# Patient Record
Sex: FEMALE | Race: WHITE
[De-identification: names, ages, dates, MRNs, and addresses within clinical notes are randomized per-mention and may not be internally consistent; named-entity substitution may affect disease eponyms.]

---

## 2018-10-02 NOTE — PHYS DOC
Past Medical History


Past Medical History:  Diabetes-Type I, GERD, High Cholesterol, Hypertension


Past Surgical History:  Hip Replacement


Alcohol Use:  None


Drug Use:  None





Adult General


Chief Complaint


Chief Complaint:  BLOOD SUGAR PROBLEM





HPI


HPI





Patient is a 63  year old F who presents with elevated blood sugars.  Patient 

reports she was treated for uti a week ago.  She felt like her symptoms have 

resolved.  Earlier today, her blood sugar was in the high 400s.  When she 

rechecked it, it was over 500.  She denies pain, n/v/d, fever or chills.





Review of Systems


Review of Systems





Constitutional: Denies fever or chills []


Respiratory: Denies cough or shortness of breath []


Cardiovascular: Denies chest pain or sob


GI: Denies abdominal pain, nausea, vomiting


: Denies dysuria or hematuria []


Musculoskeletal: Denies back pain or joint pain []


Integument: Denies rash or skin lesions []


Neurologic: Denies headache, focal weakness or sensory changes []








All other systems were reviewed and found to be within normal limits, except as 

documented in this note.





Current Medications


Current Medications





Current Medications








 Medications


  (Trade)  Dose


 Ordered  Sig/Bud  Start Time


 Stop Time Status Last Admin


Dose Admin


 


 Ondansetron HCl


  (Zofran)  4 mg  1X  ONCE  10/2/18 13:45


 10/2/18 13:46 DC  





 


 Sodium Chloride  1,000 ml @ 


 1,000 mls/hr  1X  ONCE  10/2/18 14:45


 10/2/18 15:44 DC 10/2/18 14:51


1,000 MLS/HR











Allergies


Allergies





Allergies








Coded Allergies Type Severity Reaction Last Updated Verified


 


  No Known Drug Allergies    9/24/17 No











Physical Exam


Physical Exam





Constitutional: Well developed, well nourished, no acute distress, non-toxic 

appearance. []


HENT: Normocephalic, atraumatic


Eyes: PERRLA, EOMI, conjunctiva normal, no discharge. [] 


Neck: Normal range of motion, no tenderness, supple, no stridor. [] 


Cardiovascular:Heart rate regular rhythm, no murmur []


Lungs & Thorax:  Bilateral breath sounds clear to auscultation []


Abdomen: Bowel sounds normal, soft, no tenderness, no masses, no pulsatile 

masses. [] 


Skin: Warm, dry, no erythema, no rash. [] 


Neurologic: Alert and oriented X 3, normal motor function, normal sensory 

function, no focal deficits noted. []


Psychologic: Affect normal, judgement normal, mood normal. []





Current Patient Data


Vital Signs





 Vital Signs








  Date Time  Temp Pulse Resp B/P (MAP) Pulse Ox O2 Delivery O2 Flow Rate FiO2


 


10/2/18 16:25  76 20 134/67 (89) 97   


 


10/2/18 13:24 98.6     Room Air  





 98.6       








Lab Values





 Laboratory Tests








Test


 10/2/18


13:30 10/2/18


13:31 10/2/18


13:40 10/2/18


14:39


 


Urine Collection Type Unknown     


 


Urine Color Yellow     


 


Urine Clarity Clear     


 


Urine pH 6.5     


 


Urine Specific Gravity 1.020     


 


Urine Protein


 Negative mg/dL


(NEG-TRACE) 


 


 





 


Urine Glucose (UA)


 >=1000 mg/dL


(NEG) 


 


 





 


Urine Ketones (Stick)


 Negative mg/dL


(NEG) 


 


 





 


Urine Blood


 Negative (NEG)


 


 


 





 


Urine Nitrite


 Negative (NEG)


 


 


 





 


Urine Bilirubin


 Negative (NEG)


 


 


 





 


Urine Urobilinogen Dipstick


 0.2 mg/dL (0.2


mg/dL) 


 


 





 


Urine Leukocyte Esterase


 Negative (NEG)


 


 


 





 


Urine RBC


 Occ /HPF (0-2)


 


 


 





 


Urine WBC


 Occ /HPF (0-4)


 


 


 





 


Urine Squamous Epithelial


Cells Mod /LPF  


 


 


 





 


Urine Bacteria


 Moderate /HPF


(0-FEW) 


 


 





 


Glucose (Fingerstick)


 


 436 mg/dL


(70-99)  H 


 348 mg/dL


(70-99)  H


 


White Blood Count


 


 


 5.5 x10^3/uL


(4.0-11.0) 





 


Red Blood Count


 


 


 3.47 x10^6/uL


(3.50-5.40)  L 





 


Hemoglobin


 


 


 11.9 g/dL


(12.0-15.5)  L 





 


Hematocrit


 


 


 34.9 %


(36.0-47.0)  L 





 


Mean Corpuscular Volume


 


 


 101 fL


()  H 





 


Mean Corpuscular Hemoglobin   34 pg (25-35)   


 


Mean Corpuscular Hemoglobin


Concent 


 


 34 g/dL


(31-37) 





 


Red Cell Distribution Width


 


 


 14.3 %


(11.5-14.5) 





 


Platelet Count


 


 


 270 x10^3/uL


(140-400) 





 


Neutrophils (%) (Auto)   65 % (31-73)   


 


Lymphocytes (%) (Auto)   27 % (24-48)   


 


Monocytes (%) (Auto)   7 % (0-9)   


 


Eosinophils (%) (Auto)   0 % (0-3)   


 


Basophils (%) (Auto)   1 % (0-3)   


 


Neutrophils # (Auto)


 


 


 3.6 x10^3uL


(1.8-7.7) 





 


Lymphocytes # (Auto)


 


 


 1.5 x10^3/uL


(1.0-4.8) 





 


Monocytes # (Auto)


 


 


 0.4 x10^3/uL


(0.0-1.1) 





 


Eosinophils # (Auto)


 


 


 0.0 x10^3/uL


(0.0-0.7) 





 


Basophils # (Auto)


 


 


 0.1 x10^3/uL


(0.0-0.2) 





 


Sodium Level


 


 


 135 mmol/L


(136-145)  L 





 


Potassium Level


 


 


 4.0 mmol/L


(3.5-5.1) 





 


Chloride Level


 


 


 98 mmol/L


() 





 


Carbon Dioxide Level


 


 


 29 mmol/L


(21-32) 





 


Anion Gap   8 (6-14)   


 


Blood Urea Nitrogen


 


 


 22 mg/dL


(7-20)  H 





 


Creatinine


 


 


 1.3 mg/dL


(0.6-1.0)  H 





 


Estimated GFR


(Cockcroft-Gault) 


 


 41.6  


 





 


BUN/Creatinine Ratio   17 (6-20)   


 


Glucose Level


 


 


 453 mg/dL


(70-99)  H 





 


Calcium Level


 


 


 9.4 mg/dL


(8.5-10.1) 





 


Total Bilirubin


 


 


 0.4 mg/dL


(0.2-1.0) 





 


Aspartate Amino Transferase


(AST) 


 


 15 U/L (15-37)


 





 


Alanine Aminotransferase (ALT)


 


 


 27 U/L (14-59)


 





 


Alkaline Phosphatase


 


 


 106 U/L


() 





 


Total Protein


 


 


 6.7 g/dL


(6.4-8.2) 





 


Albumin


 


 


 3.3 g/dL


(3.4-5.0)  L 





 


Albumin/Globulin Ratio   1.0 (1.0-1.7)   


 


Test


 10/2/18


16:10 


 


 





 


Glucose (Fingerstick)


 298 mg/dL


(70-99)  H 


 


 








 Laboratory Tests


10/2/18 13:40








 Laboratory Tests


10/2/18 13:40











Microbiology


10/2/18 Urine Culture - Final, Complete


          


10/2/18 Urine Culture Result 1 (LUCILA) - Final, Complete


          


10/2/18 Antimicrobic Susceptibility - Final, Complete


          








EKG


EKG


[]





Radiology/Procedures


Radiology/Procedures


[]





Course & Med Decision Making


Course & Med Decision Making


Pertinent Labs and Imaging studies reviewed. (See chart for details)





Blood sugar much better after 2L NS.  No evidence of DKA.  





Plan:  f/u with PCP, return precautions reviewed





Dragon Disclaimer


Dragon Disclaimer


This electronic medical record was generated, in whole or in part, using a 

voice recognition dictation system.





Departure


Departure


Impression:  


 Primary Impression:  


 Hyperglycemia


Disposition:  01 HOME, SELF-CARE


Condition:  IMPROVED


Referrals:  


MARIO FIELD MD (PCP)


Patient Instructions:  Hyperglycemia





Attending Signature


Attending Signature


I have reviewed the PA/NP's note and plan of care. I was available for 

consultation as needed during the patient's visit in the emergency department. 

I agree with the clinical impression, plan, and disposition.











DEMETRIO ANN Oct 2, 2018 14:04


TERRIE QUINTANA DO Oct 8, 2018 03:42

## 2018-10-05 NOTE — VNOTE
CALL BACK NOTE


CALL BACK





Microbiology


10/2/18 Urine Culture - Final, Complete


          


10/2/18 Urine Culture Result 1 (LUCILA) - Final, Complete


          


10/2/18 Antimicrobic Susceptibility - Final, Complete


          


Called patient about urine culture results, Pt reports increased urinary 

frequency. Prescription for ciprofloxacin 500 mg tab BID x7d called in to 

[facility] pharmacy in [place].





Dr. Quintana: Reviewed above and in agreement.











KAYDEN ALFORD Oct 5, 2018 14:48


TERRIE QUINTANA DO Oct 8, 2018 03:06

## 2018-12-20 NOTE — RAD
MR#: [id number]

Account#: [id number]

Accession#: [id number]

Date of Study: 12/20/2018

Ordering Physician: MIREYA KRISHNA, 

Referring Physician: SHANTAL SCOTT Tech: TAY Hurst





--------------- APPROVED REPORT --------------





Test Type:          Pharmacological

Stress Nurse/Tech: Maki Ogden RN

Test Indications: CP, CHF

Cardiac History: Hypertension, SMOKER for 20yrs, on ASA, DM on insulin, COPD

Medications:     See Electronic Medical Record

Medical History: See Electronic Medical Record



Resting Heart Rate: 56 bpm

Resting Blood Pressure: 124/53mmHg

Pretest Chest Pain: No chest pain



Nurse/Tech Notes

S1S2, diminshed LS. Pt denied CP at this point

Consent: The procedure was explained to the patient in lay terms. Informed consent was witnessed. Cam
eout was entered into [employer]. History and Stress Test performed by Maki Ogden RN



Pharm. Details

Pharmacologic stress testing was performed using 0.4mg per 5ml of regadenoson given intravenously ove
r 7-10 seconds.



Stress Symptoms

No chest pain or symptoms.



POST EXERCISE

Reason for Termination: Infusion complete

Max HR: 97 bpm

Max Blood Pressure: 144/60mmHg

Chest Pain: No. 

Arrhythmia: No. 

ST Change: No. 



INTERPRETATION

Stress EKG Conclusion: Baseline EKG showed sinus rhythm.  No ischemic changes at peak stress.  No arr
hythmias.



Imaging Protocol

IMAGE PROTOCOL: Rest Tc-99m/stress Tc-99m 1 day



Rest:            Stress:         Viability:   

Radiopharm.Tc99m BofwaydbbPc39j Sestamibi

Dose11.8mCi            32mCi            

Duration    15min.           13min.           

Img Date  12/20/2018 12/20/2018      

Inj-Img [id number].           60min.           



Rest Admin Site:IV - Left AntecubitalAdministrator:TAY Hurst

Stress Admin Site: IV - Left AntecubitalAdministrator: KEN KrishnamurthyB, ARRT (R)(N)



STRESS DATA

End Diast. Vol.63.0mlLVEDV index BSA39.0ml

End Syst. Vol.12.0mlLVESV index BSA8.0ml

Myocardial Xvlr857.0gEject. Oumgmqpn34.0%



Stress Scores

Regional WT0.00Summed WT1.00

Regional WM0.00Summed WM0.00



Study quality was good.  Left Ventricular size was Normal at Rest and Stress.

Lung uptake was .  Left Ventricular ejection fraction is 81%.

The rest and stress images show normal perfusion, normal contraction and thickening.



LV Perf. Quant

17 Seg. SSS0.00

17 Seg. SRS0.00

17 Seg. SDS0.00

Stress Defect Extent (% LAD)0.00Rest Defect Extent (% LAD)0.00Rev. Defect Extent (% LAD)0.00

Stress Defect Extent (% LCX) 0.00Rest Defect Extent (% LCX)0.00Rev. Defect Extent (% LCX)0.00

Stress Defect Extent (% RCA)0.00Rest Defect Extent (% RCA)0.00Rev. Defect Extent (% RCA)0.00

Stress Defect Extent (% DOROTEO)0.00Rest Defect Extent (% DOROTEO)0.00Rev. Defect Extent (% DOROTEO)0.00



Conclusion

1. Regadenoson cardioisotope stress test did not show any evidence of ischemia or infarct.

2. Normal left ventricular systolic function with ejection fraction calculated at 81%.

3. Low risk for cardiac events.



Signed by : Mireya Krishna 

Electronically Approved : 12/20/2018 11:25:18

## 2019-02-14 NOTE — RAD
Examination: 3 views of the right hand

 

HISTORY: History of right hand pain, frostbite in the fingertips with hand

caught between dumpster and a fence.

 

COMPARISON: None available.

 

 

Findings:

 

The evaluation of the first metacarpophalangeal joint is limited due to 

positioning, questionable subluxation of the first metacarpophalangeal 

joint. There is moderate to severe degenerative changes identified at the 

first metacarpophalangeal joint. The alignment of the interphalangeal 

joints grossly appears unremarkable. There is blunting of the soft tissue 

in the fingertips of the second and fourth digits. Faint soft tissue 

calcifications identified in the distal tip of the fifth digit..

 

IMPRESSION:

 

1. Blunting of the soft tissue of the finger tips of the second and fourth

digits probably due to known frostbite.

 

2. Questionable subluxation of the first metacarpophalangeal joint , 

evaluation is limited due to positioning.

 

Electronically signed by: Stanislav Rojas MD (2/14/2019 2:17 PM) [place] [id number]
no dysuria, no frequency, and no hematuria.

## 2019-02-14 NOTE — PHYS DOC
Past Medical History


Past Medical History:  Diabetes-Type II, GERD, High Cholesterol, Hypertension


 (LISSETTE SPANGLER)


Past Surgical History:  Hip Replacement


 (LISSETTE SPANGLER)


Additional Information:  


< 1 PPD


Alcohol Use:  None


Drug Use:  None


 (LISSETTE SPANGLER)





Adult General


Chief Complaint


Chief Complaint:  MULTIPLE COMPLAINTS





\[facility] [place] [facility]\""


HPI





Patient is a 63  year old female with history of diabetes type 2, hypertension, 

high cholesterol, who presents to the ED today to be evaluated for frostbites 

to the right hand. Patient is right-handed, she states 3 days ago she was 

standing between a dumpster and a fence trying to retrieve some coin she had 

seen on the ground. She states there was ice on the ground. She states she was 

using a brick to break the ice. She states she was out there for roughly 3 

hours breaking the ice to retrieve the coins. She states she developed 

frostbite. She states she did not go anywhere to be evaluated but presented to 

the ED today.





PCP Dr. Rodríguez


 (LISSETTE SPANGLER)





Review of Systems


Review of Systems





Constitutional: Denies fever or chills []


Eyes: Denies change in visual acuity, redness, or eye pain []


HENT: Denies nasal congestion or sore throat []


Respiratory: Denies cough or shortness of breath []


Cardiovascular: No additional information not addressed in HPI []


GI: Denies abdominal pain, nausea, vomiting, bloody stools or diarrhea []


: Denies dysuria or hematuria []


Musculoskeletal: First bites to the right hand


Integument: Denies rash or skin lesions []


Neurologic: Denies headache, focal weakness or sensory changes []





All other systems were reviewed and found to be within normal limits, except as 

documented in this note.


 (LISSETTE SPANGLER)





Current Medications


Current Medications





Current Medications








 Medications


  (Trade)  Dose


 Ordered  Sig/Bud  Start Time


 Stop Time Status Last Admin


Dose Admin


 


 Acetaminophen/


 Hydrocodone Bitart


  (Lortab 5/325)  1 tab  1X  ONCE  2/14/19 16:15


 2/14/19 16:17 DC 2/14/19 16:23


1 TAB


 


 Ceftriaxone Sodium


  (Rocephin)  1 gm  1X  ONCE  2/14/19 15:30


 2/14/19 15:31 DC 2/14/19 16:04


1 GM


 


 Morphine Sulfate


  (Morphine


 Sulfate)  5 mg  1X  ONCE  2/14/19 14:00


 2/14/19 14:01 DC 2/14/19 14:27


5 MG





 (CARMELLA LOZANO MD)





Allergies


Allergies





Allergies








Coded Allergies Type Severity Reaction Last Updated Verified


 


  No Known Drug Allergies    9/24/17 No





 (CARMELLA LOZANO MD)





Physical Exam


Physical Exam





Constitutional: Well developed, well nourished, no acute distress, non-toxic 

appearance. []


HENT: Normocephalic, atraumatic, bilateral external ears normal, oropharynx 

moist, no oral exudates, nose normal. []


Eyes: PERRLA, EOMI, conjunctiva normal, no discharge. [] 


Neck: Normal range of motion, no tenderness, supple, no stridor. [] 


Cardiovascular:Heart rate regular rhythm, no murmur []


Lungs & Thorax:  Bilateral breath sounds clear to auscultation []


Abdomen: Bowel sounds normal, soft, no tenderness, no masses, no pulsatile 

masses. [] 


Skin: Warm, dry, no erythema, no rash. [] 


Back: No tenderness, no CVA tenderness. [] 


Extremities: Right thumb no frost bites, right index fingertip with bruising 1 

x 1 cm, skin around the distal thumb is wet and macerated. Right middle finger 

with necrosis of the tip of the finger approximately 1 x 2 cm. Right ring 

finger with necrosis of the tip of the finger approximately 1 x 2 cm. Right 

pinky finger medial aspect with bruising approximately 1 x 0.5 cm. This similar 

bruising on the lateral aspect of the finger approximately 1 x 0.5 cm. There is 

multiple bruising on the palm of the hand which patient is attributing to 

feeding have bird from her hand. Similar bruising noted on the right forearm. 

Full range of motion to the right hand and fingers. Patient states she still 

has some sensation to the tip of her fingers. +2 right radial pulse.


Neurologic: Alert and oriented X 3, normal motor function, normal sensory 

function, no focal deficits noted. []


Psychologic: Affect normal, judgement normal, mood normal. []


 (LISSETTE SPANGLER)





Current Patient Data


Vital Signs





 Vital Signs








  Date Time  Temp Pulse Resp B/P (MAP) Pulse Ox O2 Delivery O2 Flow Rate FiO2


 


2/14/19 13:27 98.2 99 18 167/74 (105) 99 Room Air  





 98.2       





 (CARMELLA LOZANO MD)


Lab Values





 Laboratory Tests








Test


 2/14/19


13:31 2/14/19


14:15


 


Urine Opiates Screen Neg (NEG)   


 


Urine Methadone Screen Neg (NEG)   


 


Urine Barbiturates Neg (NEG)   


 


Urine Phencyclidine Screen Neg (NEG)   


 


Urine


Amphetamine/Methamphetamine Pos (NEG)  


 





 


Urine Benzodiazepines Screen Pos (NEG)   


 


Urine Cocaine Screen Neg (NEG)   


 


Urine Cannabinoids Screen Neg (NEG)   


 


Urine Ethyl Alcohol Neg (NEG)   


 


White Blood Count


 


 8.6 x10^3/uL


(4.0-11.0)


 


Red Blood Count


 


 4.19 x10^6/uL


(3.50-5.40)


 


Hemoglobin


 


 13.3 g/dL


(12.0-15.5)


 


Hematocrit


 


 40.3 %


(36.0-47.0)


 


Mean Corpuscular Volume


 


 96 fL ()





 


Mean Corpuscular Hemoglobin  32 pg (25-35)  


 


Mean Corpuscular Hemoglobin


Concent 


 33 g/dL


(31-37)


 


Red Cell Distribution Width


 


 12.9 %


(11.5-14.5)


 


Platelet Count


 


 177 x10^3/uL


(140-400)


 


Neutrophils (%) (Auto)  76 % (31-73)  H


 


Lymphocytes (%) (Auto)  16 % (24-48)  L


 


Monocytes (%) (Auto)  7 % (0-9)  


 


Eosinophils (%) (Auto)  1 % (0-3)  


 


Basophils (%) (Auto)  0 % (0-3)  


 


Neutrophils # (Auto)


 


 6.5 x10^3uL


(1.8-7.7)


 


Lymphocytes # (Auto)


 


 1.4 x10^3/uL


(1.0-4.8)


 


Monocytes # (Auto)


 


 0.6 x10^3/uL


(0.0-1.1)


 


Eosinophils # (Auto)


 


 0.1 x10^3/uL


(0.0-0.7)


 


Basophils # (Auto)


 


 0.0 x10^3/uL


(0.0-0.2)


 


Erythrocyte Sedimentation Rate  40 (0-25)  H


 


Sodium Level


 


 137 mmol/L


(136-145)


 


Potassium Level


 


 3.5 mmol/L


(3.5-5.1)


 


Chloride Level


 


 99 mmol/L


()


 


Carbon Dioxide Level


 


 27 mmol/L


(21-32)


 


Anion Gap  11 (6-14)  


 


Blood Urea Nitrogen


 


 25 mg/dL


(7-20)  H


 


Creatinine


 


 1.0 mg/dL


(0.6-1.0)


 


Estimated GFR


(Cockcroft-Gault) 


 56.4  





 


BUN/Creatinine Ratio  25 (6-20)  H


 


Glucose Level


 


 357 mg/dL


(70-99)  H


 


Calcium Level


 


 9.4 mg/dL


(8.5-10.1)


 


Total Bilirubin


 


 0.7 mg/dL


(0.2-1.0)


 


Aspartate Amino Transferase


(AST) 


 36 U/L (15-37)





 


Alanine Aminotransferase (ALT)


 


 35 U/L (14-59)





 


Alkaline Phosphatase


 


 97 U/L


()


 


C-Reactive Protein,


Quantitative 


 65.9 mg/L


(0-3.3)  H


 


Total Protein


 


 6.6 g/dL


(6.4-8.2)


 


Albumin


 


 3.5 g/dL


(3.4-5.0)


 


Albumin/Globulin Ratio  1.1 (1.0-1.7)  


 


Ethyl Alcohol Level


 


 < 10 mg/dL


(0-10)





 Laboratory Tests


2/14/19 14:15








 Laboratory Tests


2/14/19 14:15








 (CARMELLA LOZANO MD)





EKG


EKG


[]


 (LISSETTE SPANGLER)





Radiology/Procedures


Radiology/Procedures


[]


 (LISSETTE SPANGLER)





Course & Med Decision Making


Course & Med Decision Making


Pertinent Labs and Imaging studies reviewed. (See chart for details)





This is a 63-year-old female patient presenting to the ED today with Frost 

bites to the right fingers that occurred 3 days ago. See history of present 

illness. She has necrosis on the tip of the middle finger and ring finger.





CBC with normal WBC, CMP with glucose of 357, anion gap is normal. C-reactive 

65.9, sedimentation rate 40. Patient was started on Rocephin.





Consulted with our orthopedic doctor Nico, he requested we consult the 

hospital with a hand surgeon.





Consulted with [facility], they state this patient is to follow-up with Dr. Lala orthopedic doctor as an outpatient.





Tetanus up-to-date.





Discharge and cephalexin and hydrocodone as needed for pain. Provided 

instructions on following up with  orthopedic doctor.


 (LISSETTE SPANGLER)


Course & Med Decision Making





Staff Physician Addendum:


I was working in the ER during the course of this patient's visit.  I did 

briefly evaluate this patient did recommend orthopedics consultation for 

possible distal digital amputations disposable as noted above


 (CARMELLA LOZANO MD)


Dragon Disclaimer


Dragon Disclaimer


This electronic medical record was generated, in whole or in part, using a 

voice recognition dictation system.


 (LISSETTE SPANGLER)





Departure


Departure


Impression:  


 Primary Impression:  


 Frostbite of finger of right hand


 Additional Impression:  


 Hyperglycemia


Disposition:  01 HOME, SELF-CARE


Condition:  STABLE


Referrals:  


MARIO RODRÍGUEZ MD (PCP)


Please contact Dr. Lala at  Orthopedic at  and set up a follow 

up appointment as soon as possible


Patient Instructions:  Frostbite-SportsMed, Hyperglycemia





Additional Instructions:  


You were evaluated in the emergency room for frostbite. Please contact Dr. Lala orthopedic doctor at [facility] at [phone] and set up a follow-up 

appointment as soon as possible.


Scripts


Cephalexin (CEPHALEXIN) 500 Mg Tablet


1 TAB PO QID, #40 TAB


   Prov: LISSETTE SPANGLER         2/14/19 


Hydrocodone/Apap 5-325 (NORCO 5-325 TABLET) 1 Each Tablet


1-2 TAB PO Q4-6HRS, #25 TAB


   Prov: LISSETTE SPANGLER         2/14/19





Problem Qualifiers











LISSETTE SPANGLER Feb 14, 2019 14:57


CARMELLA LOZANO MD Feb 14, 2019 17:46

## 2019-02-18 NOTE — PHYS DOC
Past Medical History


Past Medical History:  Diabetes-Type II, GERD, High Cholesterol, Hypertension


Past Surgical History:  Hip Replacement


Alcohol Use:  None


Drug Use:  None





Adult General


Chief Complaint


Chief Complaint:  HAND PROBLEM





HPI


HPI





Patient is a 63  year old female with history of diabetes type 2, hypertension, 

high cholesterol, acid reflex, who presents to the ED today to be reevaluated 

for frostbite states she sustained almost a week ago to the right hand with 

necrosis of finger tips. Patient was seen in the ED 4 days ago by me, was 

referred to [facility] to be seen by Dr. Lala orthopedic doctor, patient 

states she has been unable to get a hold of Dr. Kennedy, patient states every 

time she called the office she was sent from one person to the other but no 

appointment was scheduled.








I personally called [facility] they stated patient needs to be seen at the 

burn unit. I got patient an appointment at the burn unit today at 2 PM. Patient 

heading to  burn urine right now.





Review of Systems


Review of Systems





Constitutional: Denies fever or chills []


: Denies dysuria or hematuria []


Musculoskeletal: frost bites to the right hand


Integument: Denies rash or skin lesions []


Neurologic: Denies headache, focal weakness or sensory changes []








All other systems were reviewed and found to be within normal limits, except as 

documented in this note.





Allergies


Allergies





Allergies








Coded Allergies Type Severity Reaction Last Updated Verified


 


  No Known Drug Allergies    9/24/17 No











Physical Exam


Physical Exam





Constitutional: Well developed, well nourished, no acute distress, non-toxic 

appearance. []


Skin: see extremities


Back: No tenderness, no CVA tenderness. [] 


Extremities: Right thumb no frost bites, right index fingertip with scabbing 1 

x 1 cm. Right middle finger with necrosis of the tip of the finger 

approximately 1 x 2 cm. Right ring finger with necrosis of the tip of the 

finger approximately 1 x 2 cm. Right pinky finger medial aspect with bruising 

approximately 1 x 0.5 cm. Similar bruising on the lateral aspect of the finger 

approximately 1 x 0.5 cm. There is multiple scabs on the palm of the hand which 

patient is attributing to feeding have bird from her hand. Similar bruising 

noted on the right forearm. Full range of motion to the right hand and fingers. 

Patient states she still has some sensation to the tip of her fingers. +2 right 

radial pulse.


Neurologic: Alert and oriented X 3, normal motor function, normal sensory 

function, no focal deficits noted. []


Psychologic: Affect normal, judgement normal, mood normal. []





Current Patient Data


Vital Signs





 Vital Signs








  Date Time  Temp Pulse Resp B/P (MAP) Pulse Ox O2 Delivery O2 Flow Rate FiO2


 


2/18/19 12:37 98.0 89 16 184/79 (114) 97 Room Air  





 98.0       











EKG


EKG


[]





Radiology/Procedures


Radiology/Procedures


[]





Course & Med Decision Making


Course & Med Decision Making


Pertinent Labs and Imaging studies reviewed. (See chart for details)





See history of present illness





Dragon Disclaimer


Dragon Disclaimer


This electronic medical record was generated, in whole or in part, using a 

voice recognition dictation system.





Departure


Departure


Impression:  


 Primary Impression:  


 Frostbite of finger of right hand


Disposition:  01 HOME, SELF-CARE


Condition:  STABLE


Referrals:  


MARIO FIELD MD (PCP)


Patient Instructions:  Frostbite, Easy-to-Read





Additional Instructions:  


Please head to [facility] burn unit to the evaluated right now at the burn 

center.











LISSETTE SPANGLER Feb 18, 2019 13:13

## 2019-03-04 NOTE — PHYS DOC
Past Medical History


Past Medical History:  Diabetes-Type II, High Cholesterol, Hypertension


Past Surgical History:  Hip Replacement


Additional Past Surgical Histo:  R LEG SX


Additional Information:  


1/2 PACK/DAY


Alcohol Use:  None


Drug Use:  None





Adult General


Chief Complaint


Chief Complaint:  HYPERGLYCEMIA





HPI


HPI





Patient is a 63  year old female with history of type 1 diabetes mellitus on 

insulin who presents with complaining of high blood sugar. Patient states for 

the last few days she has had elevation of blood sugar and this morning her 

blood sugar did as high. Patient complaining of nausea and few episode of 

vomiting and diarrhea today with generalized weakness. Patient denies change of 

urine output, focal neuro deficit, chest pain and shortness of breath, fever 

and chills missing her medication.





Review of Systems


Review of Systems





Constitutional: Denies fever or chills. Generalized weakness []


Eyes: Denies change in visual acuity, redness, or eye pain []


HENT: Denies nasal congestion or sore throat []


Respiratory: Denies cough or shortness of breath []


Cardiovascular: No additional information not addressed in HPI []


GI: Denies abdominal pain, reports nausea, vomiting,  diarrhea []


: Denies dysuria or hematuria []


Musculoskeletal: Denies back pain or joint pain []


Integument: Denies rash or skin lesions []


Neurologic: Denies headache, focal weakness or sensory changes []


Endocrine: Denies polyuria or polydipsia []





All other systems were reviewed and found to be within normal limits, except as 

documented in this note.





Current Medications


Current Medications





Current Medications








 Medications


  (Trade)  Dose


 Ordered  Sig/Bud  Start Time


 Stop Time Status Last Admin


Dose Admin


 


 Insulin Human


 Regular


  (HumuLIN R VIAL)  10 unit  1X  ONCE  3/4/19 13:00


 3/4/19 13:01 DC 3/4/19 13:43


10 UNIT


 


 Sodium Chloride  1,000 ml @ 


 1,000 mls/hr  1X  ONCE  3/4/19 14:30


 3/4/19 15:29 DC 3/4/19 14:43


1,000 MLS/HR











Allergies


Allergies





Allergies








Coded Allergies Type Severity Reaction Last Updated Verified


 


  No Known Drug Allergies    9/24/17 No











Physical Exam


Physical Exam





Constitutional: Well developed, well nourished, mild distress, non-toxic 

appearance. []


HENT: Normocephalic, atraumatic, oropharynx dry, no oral exudates, nose normal. 

[]


Eyes: PERRLA, EOMI, conjunctiva normal, no discharge. [] 


Neck: Normal range of motion, no tenderness, supple, no stridor. [] 


Cardiovascular:Heart rate regular rhythm, no murmur []


Lungs & Thorax:  Bilateral breath sounds clear to auscultation []


Abdomen: Bowel sounds normal, soft, no tenderness, no masses, no pulsatile 

masses. [] 


Skin: Warm, dry, no erythema, no rash. [] 


Back: No tenderness, no CVA tenderness. [] 


Extremities: No tenderness, no cyanosis, no clubbing, ROM intact, no edema. [] 


Neurologic: Alert and oriented X 3, normal motor function, normal sensory 

function, no focal deficits noted. []


Psychologic: Affect anxious, judgement normal, mood normal. []





Current Patient Data


Vital Signs





 Vital Signs








  Date Time  Temp Pulse Resp B/P (MAP) Pulse Ox O2 Delivery O2 Flow Rate FiO2


 


3/4/19 13:22  76 18 116/55 (75) 96 Room Air  


 


3/4/19 12:17 97.7       





 97.7       








Lab Values





 Laboratory Tests








Test


 3/4/19


12:07 3/4/19


12:22 3/4/19


13:29 3/4/19


14:43


 


Urine Collection Type Unknown     


 


Urine Color Yellow     


 


Urine Clarity Clear     


 


Urine pH 5.0     


 


Urine Specific Gravity 1.025     


 


Urine Protein


 Negative mg/dL


(NEG-TRACE) 


 


 





 


Urine Glucose (UA)


 >=1000 mg/dL


(NEG) 


 


 





 


Urine Ketones (Stick)


 >=80 mg/dL


(NEG) 


 


 





 


Urine Blood


 Negative (NEG)


 


 


 





 


Urine Nitrite


 Negative (NEG)


 


 


 





 


Urine Bilirubin


 Moderate (NEG)


 


 


 





 


Urine Urobilinogen Dipstick


 0.2 mg/dL (0.2


mg/dL) 


 


 





 


Urine Leukocyte Esterase


 Negative (NEG)


 


 


 





 


Urine RBC 0 /HPF (0-2)     


 


Urine WBC


 Occ /HPF (0-4)


 


 


 





 


Urine Squamous Epithelial


Cells Mod /LPF  


 


 


 





 


Urine Bacteria


 Few /HPF


(0-FEW) 


 


 





 


Urine Hyaline Casts Moderate /HPF     


 


Urine Mucus Mod /LPF     


 


Urine Yeast Present /HPF     


 


Urine Opiates Screen Neg (NEG)     


 


Urine Methadone Screen Neg (NEG)     


 


Urine Barbiturates Neg (NEG)     


 


Urine Phencyclidine Screen Neg (NEG)     


 


Urine


Amphetamine/Methamphetamine Pos (NEG)  


 


 


 





 


Urine Benzodiazepines Screen Neg (NEG)     


 


Urine Cocaine Screen Neg (NEG)     


 


Urine Cannabinoids Screen Neg (NEG)     


 


Urine Ethyl Alcohol Neg (NEG)     


 


Glucose (Fingerstick)


 


 459 mg/dL


(70-99)  H 


 271 mg/dL


(70-99)  H


 


White Blood Count


 


 


 10.9 x10^3/uL


(4.0-11.0) 





 


Red Blood Count


 


 


 3.64 x10^6/uL


(3.50-5.40) 





 


Hemoglobin


 


 


 11.9 g/dL


(12.0-15.5)  L 





 


Hematocrit


 


 


 35.5 %


(36.0-47.0)  L 





 


Mean Corpuscular Volume


 


 


 98 fL ()


 





 


Mean Corpuscular Hemoglobin   33 pg (25-35)   


 


Mean Corpuscular Hemoglobin


Concent 


 


 34 g/dL


(31-37) 





 


Red Cell Distribution Width


 


 


 13.3 %


(11.5-14.5) 





 


Platelet Count


 


 


 220 x10^3/uL


(140-400) 





 


Neutrophils (%) (Auto)   88 % (31-73)  H 


 


Lymphocytes (%) (Auto)   6 % (24-48)  L 


 


Monocytes (%) (Auto)   6 % (0-9)   


 


Eosinophils (%) (Auto)   0 % (0-3)   


 


Basophils (%) (Auto)   0 % (0-3)   


 


Neutrophils # (Auto)


 


 


 9.6 x10^3uL


(1.8-7.7)  H 





 


Lymphocytes # (Auto)


 


 


 0.7 x10^3/uL


(1.0-4.8)  L 





 


Monocytes # (Auto)


 


 


 0.7 x10^3/uL


(0.0-1.1) 





 


Eosinophils # (Auto)


 


 


 0.0 x10^3/uL


(0.0-0.7) 





 


Basophils # (Auto)


 


 


 0.0 x10^3/uL


(0.0-0.2) 





 


Segmented Neutrophils %   89 % (35-66)  H 


 


Band Neutrophils %   5 % (0-9)   


 


Lymphocytes %   4 % (24-48)  L 


 


Monocytes %   2 % (0-10)   


 


Platelet Estimate


 


 


 Adequate


(ADEQUATE) 





 


Sodium Level


 


 


 133 mmol/L


(136-145)  L 





 


Potassium Level


 


 


 4.2 mmol/L


(3.5-5.1) 





 


Chloride Level


 


 


 95 mmol/L


()  L 





 


Carbon Dioxide Level


 


 


 18 mmol/L


(21-32)  L 





 


Anion Gap   20 (6-14)  H 


 


Blood Urea Nitrogen


 


 


 29 mg/dL


(7-20)  H 





 


Creatinine


 


 


 1.4 mg/dL


(0.6-1.0)  H 





 


Estimated GFR


(Cockcroft-Gault) 


 


 38.2  


 





 


BUN/Creatinine Ratio   21 (6-20)  H 


 


Glucose Level


 


 


 350 mg/dL


(70-99)  H 





 


Calcium Level


 


 


 9.2 mg/dL


(8.5-10.1) 





 


Total Bilirubin


 


 


 0.5 mg/dL


(0.2-1.0) 





 


Aspartate Amino Transferase


(AST) 


 


 24 U/L (15-37)


 





 


Alanine Aminotransferase (ALT)


 


 


 30 U/L (14-59)


 





 


Alkaline Phosphatase


 


 


 88 U/L


() 





 


Total Protein


 


 


 6.2 g/dL


(6.4-8.2)  L 





 


Albumin


 


 


 3.1 g/dL


(3.4-5.0)  L 





 


Albumin/Globulin Ratio   1.0 (1.0-1.7)   


 


Lipase


 


 


 383 U/L


() 








 Laboratory Tests


3/4/19 13:29








 Laboratory Tests


3/4/19 13:29














EKG


EKG


[]





Radiology/Procedures


Radiology/Procedures


[]





Course & Med Decision Making


Course & Med Decision Making


Pertinent Labs  reviewed. (See chart for details)





Evaluation of patient in ER showed 63-year-old female patient presented with 

elevation of blood sugar. Patient had  blood sugar of 459 in ER and treated 

with bolus of IV fluid and insulin with gradual decrease of blood sugar to 271. 

Patient had bicarbonate of 16 and pH of 7.34 with improvement of her condition 

after treatment with IV fluid and insulin. Patient had early stage of DKA with 

good response to IV fluid and insulin and insulin drip was not started. Dr. Field accepted admission at 1530.





Dragon Disclaimer


Dragon Disclaimer


This electronic medical record was generated, in whole or in part, using a 

voice recognition dictation system.





Departure


Departure


Impression:  


 Primary Impression:  


 DKA (diabetic ketoacidoses)


 Additional Impressions:  


 Methamphetamine abuse


 Hyperglycemia


 Chronic renal insufficiency


 Anemia


Disposition:  09 ADMITTED AS INPATIENT (at 1531)


Admitting Physician:  Mario Field (accepted admission at 1530)


Condition:  IMPROVED


Referrals:  


MARIO FIELD MD (PCP)





Problem Qualifiers











BHASKAR MIX MD Mar 4, 2019 15:07

## 2019-03-05 NOTE — NUR
SW following pt for anticipated dc needs. Chart reviewed. Pt lives at home with significant 
other and on room air. No SW/DC needs indicated at this time. Will continue to evaluate.

## 2019-03-05 NOTE — NUR
Wound Care

Wound care consult for frost bite to right hand. Pt 1,2,3,and 4th fingertips have open 
wounds, see wound assessment. Cleansed and redressed wounds. 2nd and 3rd fingers- applied 
Medihoney and gauze, 1st and 4th fingers applied Xeroform and gauze, secured all dressings 
with stockinette. No other wounds noted on full skin inspection. WC will continue to follow 
for possible changes. Pt willing to follow up with [facility] after discharge.

## 2019-03-05 NOTE — HP
ADMIT DATE:  03/04/2019



CHIEF COMPLAINT AND HISTORY OF PRESENT ILLNESS:  This 63-year-old white female,

type 1 diabetic, well known to me from followup in the office.  The patient

presented to the Emergency Room on the day of admission with high blood sugar,

was found to be in mild DKA and admitted for the same.



PAST MEDICAL HISTORY:  Remarkable for recurrent episodes of DKA and very labile

diabetes.  She has a history of hypertension, hyperlipidemia, prior hip

replacement.



MEDICATIONS:  Brought with the patient, listed on the computer and have been

addressed.



ALLERGIES:  She has no known drug allergies.



SOCIAL HISTORY:  She has at least a half a pack per day smoker, nondrinker. 

Denies drug use, but had meth on drug screen on admission.



FAMILY HISTORY:  Noncontributory.



REVIEW OF SYSTEMS:  Remarkable for painful fingers, from which she received

frostbite here a couple of weeks ago, trying to dig something out from under

some ice.  Otherwise, has essentially negative review of systems.



PHYSICAL EXAMINATION:

GENERAL:  She is a well-developed, well-nourished white female, in no acute

distress.

VITAL SIGNS:  Stable.  She is afebrile.

HEAD, EYES, EARS, NOSE AND THROAT:  Unremarkable.

NECK:  Supple without lymphadenopathy or thyromegaly.

CHEST:  Clear to auscultation and percussion.

HEART:  Regular rate and rhythm without S3, S4 or murmur.

ABDOMEN:  Soft, nontender, without hepatosplenomegaly or masses.

EXTREMITIES:  Reveal at least for the fingers on the right hand dressed with

Vaseline impregnated gauze from the recent frostbite.

NEUROLOGIC:  She is intact.



LABORATORY DATA:  On admission does reveal mild DKA.  She does have ketones in

her urine on admission.  Again a positive drug screen for methamphetamine.  BUN

is elevated at 29, creatinine 1.4.  Initial blood sugars were 459 and is

decreased, but was over 500 this morning at the time of my evaluation.  Blood

gas showed bicarbonate low at 16, pH 7.36, pCO2 low with compensation at 29. 

CBC did show a left shift and a normal white count.



IMPRESSION:  Diabetic ketoacidosis.



ADDITIONAL DIAGNOSES: 

1.  Likely methamphetamine abuse.

2.  Acute renal failure.

3.  Frostbite, right hand.



PLAN:  We will continue treatment for the DKA.  We will restart IV fluids for

now with sliding scale insulin.  Follow labs.  Ask Wound Care to see her

regarding her hand and the patient will be monitored, managed and treated

appropriately.

 



______________________________

MARIO FIELD MD



DR:  RAÚL/elias  JOB#:  [id number] / [id number]

DD:  03/05/2019 08:35  DT:  03/05/2019 08:56

## 2019-03-06 NOTE — PN
DATE:  03/06/2019



LOCATION:  She is in room 526.



SUBJECTIVE:  The patient is awake and alert.  Her only complaints this morning

are low blood sugars.  On questioning regarding diet and insulin at home, she

states she does not understand how come her sugars are always better in the

hospital setting.  She does not think wound care has seen her for the frostbite

on her right hand yet and we are awaiting that.



OBJECTIVE:

VITAL SIGNS:  Stable.  She is afebrile.

CHEST:  Clear.

HEART:  Regular.

ABDOMEN:  Benign.



Sugars are much improved with hypoglycemic event this morning at 52 at 06:51. 

Sugars are generally 140-200.  Potassium is low this morning at 3.1.  All of her

acidosis is resolved with a normal carbon dioxide level this morning.



ASSESSMENT:

1.  Diabetic ketoacidosis, resolved.

2.  Hypokalemia.

3.  Frost bite, right hand.

4.  Methamphetamine abuse per drug screen on admission.

5.  Hypoglycemia.



PLAN:  Continue insulin adjustments.  Await wound care to make suggestions on

her right hand.  Replace potassium with eye towards probably discharge tomorrow.

 



______________________________

MARIO FIELD MD



DR:  RAÚL/elias  JOB#:  [id number] / [id number]

DD:  03/06/2019 08:53  DT:  03/06/2019 22:47

## 2019-03-06 NOTE — NUR
SS following up with discharge planning. Pt positive for methamphetamines. SS contacted PAT 
team and made referral for evaluation and resources.

## 2019-03-07 NOTE — PDOC2
CONSULT


Date of Consult


Date of Consult


DATE: 3/7/19 


TIME: 12:11





Reason for Consult


Reason for Consult:


Right hand frostbite injury





Referring Physician


Referring Physician:


Dr. Rodríguez





Identification/Chief Complaint


Chief Complaint


Right hand frostbite injury





Source


Source:  Chart review, Patient





History of Present Illness


Reason for Visit:


This is a 63-year-old patient who reports several week history of frostbite 

injury to the right hand. She was aware of blackened, necrotic fingertips after 

digging in the snow. We note comorbidities of tobacco use and methamphetamine 

suspicion. Patient does not describe similar injuries in the past or at least 

cannot recall them. We note that the fingers were subjected to cold and 

pressure after the development of frostbite. She is aware of modest local 

discomfort with primary pain coming from the right long finger.





Past Medical History


Cardiovascular:  HTN, Hyperlipidemia


Pulmonary:  COPD


CENTRAL NERVOUS SYSTEM:  Periperal neuropathy


GI:  GERD


Heme/Onc:  No pertinent hx


Hepatobiliary:  Cirrhosis


Psych:  Depression


Musculoskeletal:  Osteoarthritis


Rheumatologic:  No pertinent hx


Infectious disease:  No pertinent hx


Renal/:  No pertinent hx


Endocrine:  Diabetes





Past Surgical History


Past Surgical History:  Other





Family History


Family History:  Coronary Artery Disease, Drug Abuse (suspicion of 

methamphetamine use)





Social History


ALCOHOL:  none


Drugs:  None


Lives:  with Family





Current Problem List


Problem List


Problems


Medical Problems:


(1) Anemia


Status: Acute  





(2) Chronic renal insufficiency


Status: Acute  





(3) Hyperglycemia


Status: Acute  











Current Medications


Current Medications





Current Medications


Sodium Chloride 1,000 ml @  1,000 mls/hr Q1H IV  Last administered on 3/4/19at 

13:33;  Start 3/4/19 at 12:26;  Stop 3/4/19 at 13:25;  Status DC


Insulin Human Regular (HumuLIN R VIAL) 10 unit 1X  ONCE IV  Last administered 

on 3/4/19at 13:43;  Start 3/4/19 at 13:00;  Stop 3/4/19 at 13:01;  Status DC


Sodium Chloride 1,000 ml @  1,000 mls/hr 1X  ONCE IV  Last administered on 3/4/

19at 14:43;  Start 3/4/19 at 14:30;  Stop 3/4/19 at 15:29;  Status DC


Aspirin (Children'S Aspirin) 81 mg DAILY PO  Last administered on 3/7/19at 08:27

;  Start 3/5/19 at 09:00


Insulin Glargine (Lantus) 19 units QHS SQ  Last administered on 3/5/19at 20:58;

  Start 3/5/19 at 01:30;  Stop 3/6/19 at 08:55;  Status DC


Lisinopril (Prinivil) 20 mg DAILY PO  Last administered on 3/7/19at 08:26;  

Start 3/5/19 at 09:00


Calcium Carbonate/ Glycine (Tums) 500 mg DAILY PO  Last administered on 3/7/

19at 08:25;  Start 3/5/19 at 09:00


Non-Formulary Medication (Cephalexin ) 1 tab QID PO ;  Start 3/5/19 at 09:00;  

Stop 3/5/19 at 09:00;  Status DC


Citalopram Hydrobromide (CeleXA) 20 mg DAILY PO  Last administered on 3/7/19at 

08:26;  Start 3/5/19 at 09:00


Insulin Human Lispro (HumaLOG) 6-7 UNITS TIDWMEALS SQ ;  Start 3/5/19 at 08:00;

  Stop 3/5/19 at 08:48;  Status DC


Meloxicam (Mobic) 7.5 mg DAILY PO  Last administered on 3/7/19at 08:27;  Start 3

/5/19 at 09:00


Amlodipine Besylate (Norvasc) 5 mg DAILY PO  Last administered on 3/7/19at 08:27

;  Start 3/5/19 at 09:00


Furosemide (Lasix) 20 mg DAILY PO  Last administered on 3/7/19at 08:26;  Start 3

/5/19 at 09:00


Acetaminophen/ Hydrocodone Bitart (Lortab 5/325) 2 tab PRN Q4HRS  PRN PO 

MODERATE PAIN;  Start 3/5/19 at 08:30;  Stop 3/5/19 at 08:48;  Status DC


Oxybutynin Chloride (Ditropan) 5 mg TID PO  Last administered on 3/7/19at 08:27

;  Start 3/5/19 at 09:00


Atorvastatin Calcium (Lipitor) 20 mg QHS PO  Last administered on 3/6/19at 21:01

;  Start 3/5/19 at 21:00


Pantoprazole Sodium (Protonix) 40 mg DAILYAC PO  Last administered on 3/7/19at 

08:26;  Start 3/5/19 at 09:00


Sodium Chloride 1,000 ml @  125 mls/hr Q8H IV  Last administered on 3/7/19at 05:

16;  Start 3/5/19 at 08:45


Insulin Human Lispro (HumaLOG) 10 units 1X  ONCE SQ  Last administered on 3/5/

19at 09:05;  Start 3/5/19 at 08:45;  Stop 3/5/19 at 08:49;  Status DC


Insulin Human Lispro (HumaLOG) 0-9 UNITS TIDWMEALS SQ  Last administered on 3/6/

19at 16:55;  Start 3/5/19 at 12:00


Dextrose (Dextrose 50%-Water Syringe) 12.5 gm PRN Q15MIN  PRN IV SEE COMMENTS 

Last administered on 3/6/19at 06:54;  Start 3/5/19 at 08:45


Acetaminophen/ Hydrocodone Bitart (Lortab 5/325) 1 tab PRN Q4HRS  PRN PO 

MODERATE PAIN;  Start 3/5/19 at 08:48


Acetaminophen/ Hydrocodone Bitart (Lortab 5/325) 2 tab PRN Q4HRS  PRN PO 

MODERATE PAIN;  Start 3/5/19 at 09:00


Potassium Chloride (Klor-Con) 40 meq 1X  ONCE PO  Last administered on 3/6/19at 

09:34;  Start 3/6/19 at 09:30;  Stop 3/6/19 at 09:31;  Status DC


Insulin Glargine (Lantus) 15 units QHS SQ  Last administered on 3/6/19at 21:21;

  Start 3/6/19 at 21:00


Potassium Chloride (Klor-Con) 40 meq 1X  ONCE PO  Last administered on 3/7/19at 

05:16;  Start 3/7/19 at 05:00;  Stop 3/7/19 at 05:01;  Status DC


Potassium Chloride (Klor-Con) 40 meq 1X  ONCE PO ;  Start 3/7/19 at 16:00;  

Stop 3/7/19 at 16:01





Active Scripts


Active


Cephalexin 500 Mg Tablet 1 Tab PO QID


Norco 5-325 Tablet (Acetaminophen/Hydrocodone Bitart) 1 Each Tablet 1-2 Tab PO 

Q4-6HRS


Reported


Tums (Calcium Carbonate) 300 Mg Tab.chew 300 Mg PO DAILY


Lantus Solostar (Insulin Glargine,Hum.rec.anlog) 100 Unit/1 Ml Insuln.pen 19 

Unit SQ QHS


Norco  Tablet (Acetaminophen/Hydrocodone Bitart) 1 Each Tablet 1 Tab PO 

PRN Q6HRS PRN


Novolog Flexpen (Insulin Aspart) 100 Unit/1 Ml Insuln.pen 6-7 Unit SQ TIDAC


Lasix (Furosemide) 20 Mg Tablet 20 Mg PO DAILY


Women Multivit W-Biotin Gummy (Multivit-Min/Folic Acid/Biotin) 1 Each Tab.chew 

1 Each PO DAILY


Norvasc (Amlodipine Besylate) 5 Mg Tablet 5 Mg PO DAILY


Mobic (Meloxicam) 7.5 Mg Tablet 7.5 Mg PO DAILY


Oxybutynin Chloride 5 Mg Tablet 5 Mg PO TID


Lovastatin 20 Mg Tablet 10 Mg PO HS


Lexapro (Escitalopram Oxalate) 10 Mg Tablet 10 Mg PO DAILY


Protonix (Pantoprazole Sodium) 20 Mg Tablet.dr 40 Mg PO DAILY


Tresiba Flextouch U-100 (Insulin Degludec) 100 Unit/1 Ml Insuln.pen 15 Unit SQ 

QHS


Lisinopril 20 Mg Tablet 1 Tab PO DAILY


Aspirin 81 Mg Tab.chew 1 Tab PO DAILY





Allergies


Allergies:  


Coded Allergies:  


     No Known Drug Allergies (Unverified , 9/24/17)





ROS


Review of System


Patient is somewhat poor a historian. Focus and concentration appears difficult 

for her. However, review of systems negative except as reported below


PSYCHOLOGICAL ROS:  YES: Anxiety


Respiratory:  YES: Cough


Cardiovascular:  yes Other (Quant of flow measurement in the left lower 

extremity is 1.26)





Physical Exam


General:  Alert, Oriented X3, Cooperative, No acute distress


HEENT:  Atraumatic, PERRLA, EOMI, Mucous membr. moist/pink


Lungs:  Clear to auscultation, Normal air movement


Heart:  Regular rate


Abdomen:  Soft, No tenderness


Extremities:  No cyanosis, No edema


Skin:  Other (right hand index long and ring finger demonstrate frostbite 

injury. There are necrotic distal tips with the most significant area of injury 

being the long finger. This measures roughly 0.5 x 0.5 cm. In several areas 

there is loosening of the edge with early pustular drainage. There is some 

modest erythema in the periwound. On the left fourth toe with identify for 

diabetic ulcer of unknown severity.)


Neuro:  Other (speech is somewhat rapid.)


Psych/Mental Status:  Other (mood is anxious)


MUSCULOSKELETAL:  Osteoarthritic changes both hands





Vitals


VITALS





Vital Signs








  Date Time  Temp Pulse Resp B/P (MAP) Pulse Ox O2 Delivery O2 Flow Rate FiO2


 


3/7/19 11:00 98.1 71 18 145/81 (102) 97 Room Air  





 98.1       


 


3/6/19 07:00        











Labs


Labs





Laboratory Tests








Test


 3/5/19


16:55 3/5/19


20:11 3/6/19


05:30 3/6/19


06:51


 


Glucose (Fingerstick)


 117 mg/dL


(70-99) 146 mg/dL


(70-99) 


 52 mg/dL


(70-99)


 


Sodium Level


 


 


 143 mmol/L


(136-145) 





 


Potassium Level


 


 


 3.1 mmol/L


(3.5-5.1) 





 


Chloride Level


 


 


 106 mmol/L


() 





 


Carbon Dioxide Level


 


 


 25 mmol/L


(21-32) 





 


Anion Gap   12 (6-14)  


 


Blood Urea Nitrogen


 


 


 11 mg/dL


(7-20) 





 


Creatinine


 


 


 1.0 mg/dL


(0.6-1.0) 





 


Estimated GFR


(Cockcroft-Gault) 


 


 56.4 


 





 


BUN/Creatinine Ratio   11 (6-20)  


 


Glucose Level


 


 


 53 mg/dL


(70-99) 





 


Calcium Level


 


 


 8.7 mg/dL


(8.5-10.1) 





 


Total Bilirubin


 


 


 0.4 mg/dL


(0.2-1.0) 





 


Aspartate Amino Transf


(AST/SGOT) 


 


 19 U/L (15-37) 


 





 


Alanine Aminotransferase


(ALT/SGPT) 


 


 26 U/L (14-59) 


 





 


Alkaline Phosphatase


 


 


 87 U/L


() 





 


Total Protein


 


 


 5.6 g/dL


(6.4-8.2) 





 


Albumin


 


 


 2.8 g/dL


(3.4-5.0) 





 


Albumin/Globulin Ratio   1.0 (1.0-1.7)  


 


Test


 3/6/19


07:06 3/6/19


08:10 3/6/19


11:12 3/6/19


16:40


 


Glucose (Fingerstick)


 129 mg/dL


(70-99) 211 mg/dL


(70-99) 248 mg/dL


(70-99) 286 mg/dL


(70-99)


 


Test


 3/6/19


20:19 3/7/19


03:20 3/7/19


07:42 3/7/19


11:47


 


Glucose (Fingerstick)


 256 mg/dL


(70-99) 


 84 mg/dL


(70-99) 233 mg/dL


(70-99)


 


Sodium Level


 


 144 mmol/L


(136-145) 


 





 


Potassium Level


 


 2.8 mmol/L


(3.5-5.1) 


 





 


Chloride Level


 


 106 mmol/L


() 


 





 


Carbon Dioxide Level


 


 30 mmol/L


(21-32) 


 





 


Anion Gap  8 (6-14)   


 


Blood Urea Nitrogen  7 mg/dL (7-20)   


 


Creatinine


 


 0.7 mg/dL


(0.6-1.0) 


 





 


Estimated GFR


(Cockcroft-Gault) 


 85.1 


 


 





 


Glucose Level


 


 145 mg/dL


(70-99) 


 





 


Calcium Level


 


 9.0 mg/dL


(8.5-10.1) 


 











Laboratory Tests








Test


 3/6/19


16:40 3/6/19


20:19 3/7/19


03:20 3/7/19


07:42


 


Glucose (Fingerstick)


 286 mg/dL


(70-99) 256 mg/dL


(70-99) 


 84 mg/dL


(70-99)


 


Sodium Level


 


 


 144 mmol/L


(136-145) 





 


Potassium Level


 


 


 2.8 mmol/L


(3.5-5.1) 





 


Chloride Level


 


 


 106 mmol/L


() 





 


Carbon Dioxide Level


 


 


 30 mmol/L


(21-32) 





 


Anion Gap   8 (6-14)  


 


Blood Urea Nitrogen   7 mg/dL (7-20)  


 


Creatinine


 


 


 0.7 mg/dL


(0.6-1.0) 





 


Estimated GFR


(Cockcroft-Gault) 


 


 85.1 


 





 


Glucose Level


 


 


 145 mg/dL


(70-99) 





 


Calcium Level


 


 


 9.0 mg/dL


(8.5-10.1) 





 


Test


 3/7/19


11:47 


 


 





 


Glucose (Fingerstick)


 233 mg/dL


(70-99) 


 


 














Assessment/Plan


Assessment/Plan


Frostbite injury to right hand


Diabetic ulceration left forefoot of unspecified severity





Debridement note


Following informed verbal consent, necrotic softened edges were removed 

utilizing forceps and scissors from the right long finger. Total area debrided 

was 1 cm. No viable tissue is removed. This was an excisional debridement of 

skin only.. Firm dry necrotic cap Was allowed to remain. Topical lidocaine 

anesthetic was utilized. No bleeding occurred and patient voice no discomfort.





Recommendations for simple dressings and padded protection for the right long 

finger made. We discussed generous open toed shoe wear. We would like to follow 

the patient up in the clinic if possible particularly for the ulceration 

identified to the left forefoot.


Strong recommendations made for cessation of tobacco use and abstinence from 

illegal drug use.











ESTEFANY GOSS DO Mar 7, 2019 12:28

## 2019-03-07 NOTE — NUR
Wound Care

Pt seen for wound care follow up with Dr. Maharaj at bedside for possible debridement of 
fingertips.  Dressings removed, wounds cleaned, and after informed consent, R 4th finger 
wound debrided, see Dr. Maharaj's dictation. Pt tolerated well, wound was photographed, and 
pt stated she would like to f/u in the clinic for wound care. Finger tips painted with 
betadine, and dry cotton wraps applied over fingers, L 5th toe painted with betadine and 
left ANJELICA. Will continue to follow for wound care needs.

## 2019-03-07 NOTE — PN
DATE:  03/07/2019



LOCATION:  Room 526.



SUBJECTIVE:  The patient is awake, alert, beginning to eat breakfast, still

complains of the right hand being difficulty using it because of the frostbite

and the dressings.



OBJECTIVE:

VITAL SIGNS:  Stable.  She is afebrile.

GENERAL:  She is awake and alert.

CHEST:  Clear.

HEART:  Regular.

ABDOMEN:  Benign.



LABORATORY DATA:  Sugars are better.  Potassium is decreased even this morning,

down to 2.8 and 80 mg p.o. will be given today.  This is likely due to the

resolution of the DKA and hopefully by tomorrow, we will be to the point where

it would be safe to think about discharge to home.  The rest of her labs are

better.  Sugar this morning was 84, 200 for sugars yesterday which for her

lability is not bad.  Wound care has not seen yet regarding the hand and would

like to see this happen prior to discharge in addition.



IMPRESSION:

1.  Diabetic ketoacidosis, resolved.

2.  Profound hypokalemia.

3.  Frostbite, right hand.

4.  Methamphetamine abuse per drug screen on admission.

5.  Labile diabetes.



PLAN:  Continue present insulin regimen as I believe this would probably serve

her well at home with no hypoglycemia in the last 24 hours.  Replace potassium

and await wound care suggestions on the frost bit on her hand.

 



______________________________

MARIO FIELD MD



DR:  RAÚL/elias  JOB#:  [id number] / [id number]

DD:  03/07/2019 08:42  DT:  03/07/2019 18:51

## 2019-03-08 NOTE — NUR
Discharge Note:



AYAKA LARIOS



Discharge instructions and discharge home medications reviewed with Patient and a copy 
given. All questions have been answered and understanding verbalized. 



The following instructions and handouts were given: Hyperglycemia



Discontinued lines and drains: Peripheral IV intact.



Patient discharged to Home or Self Care with[facility] Walked out with RN

## 2019-03-08 NOTE — DS
DATE OF DISCHARGE:  03/08/2019



PRIMARY DIAGNOSIS:  Diabetic ketoacidosis.



ADDITIONAL DIAGNOSES:  Methamphetamine abuse, acute renal failure, frostbite of

right hand, hypertension, hyperlipidemia and hypokalemia.



CHIEF COMPLAINT AND HISTORY OF PRESENT ILLNESS:  This 63-year-old white female

admitted through the Emergency Room, on the day of admission with extremely high

blood sugars and was found to be in mild DKA and admitted to the hospital with

hydration and sugar control.



SUMMARY OF STAY:  The patient's vital signs were decent throughout the stay. 

Her oxygenation was good.  Initial CBC was remarkable for white count of 10,900

with left shift.  Chemistries on admission included a sugar of 459, CO2

decreased at 18, creatinine elevated at 1.4 and sodium low at 133.  Blood gas

showed a pH of 7.36 with a bicarb low at 16.  Toxicology screen was positive for

methamphetamines.  Urine was essentially unremarkable.  Again was treated

throughout the stay with fluids, subcutaneous insulin with adjustment of the

same.  Also hypokalemia, after the DKA resolved and that was replaced orally and

was back in the normal range on the day of discharge at 4.5.



DISPOSITION:  The patient is discharged to home.  ADA diet, activity as

tolerated, office in 1 week.



DISCHARGE MEDICATIONS:  Listed on the med rec and have been addressed and will

be her normal regular home medications.

 



______________________________

MARIO FIELD MD DR:  RAÚL/elias  JOB#:  [id number] / [id number]

DD:  03/08/2019 21:51  DT:  03/08/2019 22:55

## 2019-03-10 NOTE — RAD
AP chest.

 

HISTORY: Weakness, hyperglycemia

 

AP view was taken of the chest. Lungs are free of infiltrates. Heart is 

normal in size without heart failure. There is no effusion.

 

IMPRESSION:

1. No acute infiltrates.

 

Electronically signed by: Nacho Nguyen MD (3/10/2019 11:07 AM) [facility]

## 2019-03-10 NOTE — PHYS DOC
Past Medical History


Past Medical History:  Diabetes-Type II, High Cholesterol, Hypertension


Past Surgical History:  Hip Replacement


Additional Past Surgical Histo:  R LEG SX


Smoking:  Cigarettes


Alcohol Use:  None


Drug Use:  Methadone





Adult General


Chief Complaint


Chief Complaint:  HYPERGLYCEMIA





HPI


HPI





Patient is a 63  year old female presents complaining of high blood sugar. 

Patient was recently admitted for high blood sugar and was discharged 2 days 

ago. Reports that her sugar has not come down to its normal levels. Patient 

denies that there were any adjustments made in her blood sugar while admitted. 

She called her primary care physician's office morning, who directed her to the 

emergency department. She notes increased thirst, polyuria, and generalized 

weakness. Denies any chest pain or palpitations. Denies any fever. She reports 

trying to stick with her diabetic diet.[]





Review of Systems


Review of Systems





Constitutional: Denies fever or chills []


Eyes: Denies change in visual acuity, redness, or eye pain []


HENT: Denies nasal congestion or sore throat []


Respiratory: Denies cough or shortness of breath []


Cardiovascular: No chest pain or palpitations[]


GI: Denies abdominal pain, nausea, vomiting, bloody stools or diarrhea []


: Denies dysuria or hematuria []


Musculoskeletal: Denies back pain or joint pain []


Integument: Denies rash or skin lesions []


Neurologic: Denies headache, focal weakness or sensory changes []


Endocrine: See history of present illness[]





All other systems were reviewed and found to be within normal limits, except as 

documented in this note.





Current Medications


Current Medications





Current Medications








 Medications


  (Trade)  Dose


 Ordered  Sig/[facility]  Start Time


 Stop Time Status Last Admin


Dose Admin


 


 Sodium Chloride  1,000 ml @ 


 1,000 mls/hr  Q1H  3/10/19 10:53


 3/10/19 11:52 DC 3/10/19 10:53


1,000 MLS/HR











Allergies


Allergies





Allergies








Coded Allergies Type Severity Reaction Last Updated Verified


 


  No Known Drug Allergies    9/24/17 No











Physical Exam


Physical Exam





Constitutional: Well developed, well nourished, no acute distress, non-toxic 

appearance. []


HENT: Normocephalic, atraumatic, bilateral external ears normal, oropharynx 

moist, no oral exudates, nose normal. []


Eyes: PERRLA, EOMI, conjunctiva normal, no discharge. [] 


Neck: Normal range of motion, no tenderness, supple, no stridor. [] 


Cardiovascular:Heart rate regular rhythm, no murmur []


Lungs & Thorax:  Bilateral breath sounds clear to auscultation []


Abdomen: Bowel sounds normal, soft, no tenderness, no masses, no pulsatile 

masses. [] 


Skin: Warm, dry, no erythema, no rash. [] 


Back: No tenderness, no CVA tenderness. [] 


Extremities: No tenderness, no cyanosis, no clubbing, ROM intact, no edema. [] 


Neurologic: Alert and oriented X 3, normal motor function, normal sensory 

function, no focal deficits noted. []


Psychologic: Affect normal, judgement normal, mood normal. []





Current Patient Data


Vital Signs





 Vital Signs








  Date Time  Temp Pulse Resp B/P (MAP) Pulse Ox O2 Delivery O2 Flow Rate FiO2


 


3/10/19 10:34 98.3 76 18 131/60 (83) 98 Room Air  





 98.3       








Lab Values





 Laboratory Tests








Test


 3/10/19


10:30 3/10/19


10:38 3/10/19


10:50 3/10/19


12:27


 


Urine Collection Type Unknown     


 


Urine Color Yellow     


 


Urine Clarity Hazy     


 


Urine pH 5.5     


 


Urine Specific Gravity 1.025     


 


Urine Protein


 Negative mg/dL


(NEG-TRACE) 


 


 





 


Urine Glucose (UA)


 >=1000 mg/dL


(NEG) 


 


 





 


Urine Ketones (Stick)


 >=80 mg/dL


(NEG) 


 


 





 


Urine Blood


 Negative (NEG)


 


 


 





 


Urine Nitrite


 Negative (NEG)


 


 


 





 


Urine Bilirubin


 Negative (NEG)


 


 


 





 


Urine Urobilinogen Dipstick


 0.2 mg/dL (0.2


mg/dL) 


 


 





 


Urine Leukocyte Esterase


 Negative (NEG)


 


 


 





 


Urine RBC


 Occ /HPF (0-2)


 


 


 





 


Urine WBC


 1-4 /HPF (0-4)


 


 


 





 


Urine Squamous Epithelial


Cells Mod /LPF  


 


 


 





 


Urine Bacteria


 Many /HPF


(0-FEW) 


 


 





 


Glucose (Fingerstick)


 


 486 mg/dL


(70-99)  H 


 332 mg/dL


(70-99)  H


 


White Blood Count


 


 


 7.8 x10^3/uL


(4.0-11.0) 





 


Red Blood Count


 


 


 4.09 x10^6/uL


(3.50-5.40) 





 


Hemoglobin


 


 


 13.0 g/dL


(12.0-15.5) 





 


Hematocrit


 


 


 39.9 %


(36.0-47.0) 





 


Mean Corpuscular Volume


 


 


 98 fL ()


 





 


Mean Corpuscular Hemoglobin   32 pg (25-35)   


 


Mean Corpuscular Hemoglobin


Concent 


 


 33 g/dL


(31-37) 





 


Red Cell Distribution Width


 


 


 13.1 %


(11.5-14.5) 





 


Platelet Count


 


 


 226 x10^3/uL


(140-400) 





 


Neutrophils (%) (Auto)   76 % (31-73)  H 


 


Lymphocytes (%) (Auto)   17 % (24-48)  L 


 


Monocytes (%) (Auto)   5 % (0-9)   


 


Eosinophils (%) (Auto)   2 % (0-3)   


 


Basophils (%) (Auto)   1 % (0-3)   


 


Neutrophils # (Auto)


 


 


 6.0 x10^3uL


(1.8-7.7) 





 


Lymphocytes # (Auto)


 


 


 1.3 x10^3/uL


(1.0-4.8) 





 


Monocytes # (Auto)


 


 


 0.4 x10^3/uL


(0.0-1.1) 





 


Eosinophils # (Auto)


 


 


 0.1 x10^3/uL


(0.0-0.7) 





 


Basophils # (Auto)


 


 


 0.0 x10^3/uL


(0.0-0.2) 





 


Sodium Level


 


 


 138 mmol/L


(136-145) 





 


Potassium Level


 


 


 3.7 mmol/L


(3.5-5.1) 





 


Chloride Level


 


 


 96 mmol/L


()  L 





 


Carbon Dioxide Level


 


 


 23 mmol/L


(21-32) 





 


Anion Gap   19 (6-14)  H 


 


Blood Urea Nitrogen


 


 


 19 mg/dL


(7-20) 





 


Creatinine


 


 


 1.2 mg/dL


(0.6-1.0)  H 





 


Estimated GFR


(Cockcroft-Gault) 


 


 45.7  


 





 


BUN/Creatinine Ratio   16 (6-20)   


 


Glucose Level


 


 


 459 mg/dL


(70-99)  H 





 


Calcium Level


 


 


 9.5 mg/dL


(8.5-10.1) 





 


Total Bilirubin


 


 


 0.6 mg/dL


(0.2-1.0) 





 


Aspartate Amino Transferase


(AST) 


 


 24 U/L (15-37)


 





 


Alanine Aminotransferase (ALT)


 


 


 30 U/L (14-59)


 





 


Alkaline Phosphatase


 


 


 109 U/L


() 





 


Troponin I Quantitative


 


 


 < 0.017 ng/mL


(0.000-0.055) 





 


Total Protein


 


 


 6.9 g/dL


(6.4-8.2) 





 


Albumin


 


 


 3.2 g/dL


(3.4-5.0)  L 





 


Albumin/Globulin Ratio


 


 


 0.9 (1.0-1.7)


L 





 


Acetone Level   Sm pos (NEG)   





 Laboratory Tests


3/10/19 10:50








 Laboratory Tests


3/10/19 10:50














EKG


EKG


EKG shows a sinus rhythm, rate of 69 bpm, normal axis, QTC of 450 ms, no ST 

elevations. Turbid by me at 1111.[]





Radiology/Procedures


Radiology/Procedures


AP chest.


 


HISTORY: Weakness, hyperglycemia


 


AP view was taken of the chest. Lungs are free of infiltrates. Heart is 


normal in size without heart failure. There is no effusion.


 


IMPRESSION:


1. No acute infiltrates.


 []





Course & Med Decision Making


Course & Med Decision Making


Pertinent Labs and Imaging studies reviewed. (See chart for details)





ED course and medical decision making: Patient arrived, was placed in bed, and 

tolerated exam well. Her blood sugar improved from the 500 level that she noted 

this morning to the 400s prior to any treatment. She was given IV fluids and 

her sugar again improved to the 300s. This does not appear to be DKA nor an 

acute coronary syndrome. Does not appear to be a urinary tract infection or 

pneumonia is a trigger for the elevated blood sugar. Discussed her care with 

her primary care physician, Dr. Field, who will follow up with her tomorrow. We 

will reinforce the need to follow the ADA diet.[]





Dragon Disclaimer


Dragon Disclaimer


This electronic medical record was generated, in whole or in part, using a 

voice recognition dictation system.





Departure


Departure


Impression:  


 Primary Impression:  


 Hyperglycemia


Disposition:  01 HOME, SELF-CARE


Condition:  IMPROVED


Referrals:  


MARIO FIELD MD (PCP)


Call the office first thing in the morning, you will be seen by doctor Appl 

tomorrow


Patient Instructions:  1800 Calorie Diet for Diabetes Meal Planning, Type 2 

Diabetes Mellitus, Adult





Additional Instructions:  


Follow your diabetic diet.


Follow-up with your regular doctor. Call the office first thing in the morning 

tomorrow to be seen by him tomorrow. 


Return to the ER if any concerns.











LOKESH MORAN DO Mar 10, 2019 11:19

## 2019-04-25 NOTE — EKG
[facility] acute hospital

              [address] [place] [address]

Test Date:    2019-03-10               Test Time:    11:09:51

Pat Name:     MISAEL LARIOS          Department:   

Patient ID:   [id number]           Room:          

Gender:       F                        Technician:   

:          1955               Requested By: LOKESH MORAN

Order Number: [id number]           Reading MD:   Landon Goddard MD

                                 Measurements

Intervals                              Axis          

Rate:         69                       P:            

WV:                                    QRS:          28

QRSD:         86                       T:            19

QT:           386                                    

QTc:          415                                    

                           Interpretive Statements

SR

NON-SPECIFIC ST/T CHANGES

Electronically Signed On 3- 22:22:25 CDT by Landon Goddard MD
Discharged

## 2019-07-08 NOTE — RAD
Examination: HAND RIGHT 3V

 

History: Pain and swelling from a fall 4 days ago

 

Comparison/Correlation: 2/14/2019 right hand x-ray exam

 

Findings: 3 images of the right hand were obtained. Amputation of the 

fourth digit distal phalanx bony and soft tissue tuft is evident. No 

radiopaque foreign bodies. Narrowing of the second and third 

metacarpophalangeal joints noted. Osteopenia evident. First 

metacarpophalangeal joint degenerative narrowing is notable.

 

 

Impression:

Indication fourth digit distal phalangeal tuft. Correlate with history of 

trauma.

 

Degenerative changes and osteopenia.

 

No displaced fracture identified.

 

Electronically signed by: Ian Salgado MD (7/8/2019 5:14 PM) [facility]

## 2019-08-02 NOTE — [facility]
MRI Lumbar Spine without contrast

 

History: Low back pain into the left hip, previous surgery

 

Technique: Multiplanar, multi sequential noncontrast MR imaging was 

performed of the lumbar spine.

 

Comparison: None

 

Findings: 

AP alignment is within normal limits. There is moderate levoscoliosis 

centered near L2-3, mild dextroscoliosis inferiorly. Vertebral body 

stature is overall maintained, small Schmorl's nodes at L2-3. There is 

advanced degenerative disc disease at L2-3 and L4-5 and to lesser degree 

L3-4, minimally L1-L2. There is variable endplate edema L2-3 and L4-5 

likely reactive/degenerative in etiology. There is very mild left lateral 

subluxation of L2 relative L3 and L3 relative to L4.

 

Not fully included, there is prominent edema of the right sacrum.

 

There is a small T2 hyperintense lesion of the right kidney about 1.2 cm, 

some other smaller foci also present of the bilateral kidneys.

 

T12-L1: There is mild buckling of the ligamentum flavum. Spinal canal and 

neural foramina are adequate.

 

L1-L2:  There are posterior and anterior annular tears. There is mild 

buckling of the ligamentum flavum and facet degenerative change. There is 

minimal disc osteophyte complex and bulge. Spinal canal and neural 

foramina are adequate.

 

L2-L3:  There is disc osteophyte complex and bulge. There is mild facet 

hypertrophic change and buckling of the ligamentum flavum. There is mild 

narrowing of the far lateral recesses bilaterally. There is moderate 

narrowing of the right neural foramen, left neural foramen adequate.

 

L3-L4:  There is minimal disc osteophyte complex and bulge. There is mild 

buckling of the ligamentum flavum and left greater than right facet 

degenerative change. There is mild to moderate narrowing of the far left 

lateral recess. There is fairly severe narrowing of the left neural 

foramen primarily from posteriorly by facet with contact exiting left L3 

nerve root, mild narrowing of the right neural foramen greater distally.

 

L4-L5:  There is minimal disc osteophyte complex. There is mild buckling 

of the ligamentum flavum. There is moderate left and mild right facet 

degenerative change. There is severe narrowing of the left neural foramen 

in part from disc osteophyte complex in combination with facet 

hypertrophic change, significant impingement of the exiting left L4 nerve 

root extending to the proximal extraforaminal region. There is mild to 

moderate narrowing of the right neural foramen.

 

L5-S1:  Spinal canal and neural foramina are adequate. There is mild left 

facet degenerative change.

 

Impression: 

 

1.  Not fully evaluated, there is prominent edema of the right sacrum 

which could be due to more recent insufficiency fracture.

2. There is severe narrowing of the left L3-4 and L4-5 neural foramina 

with contact exiting left L3 and L4 nerve roots respectively, moderate 

narrowing on the right at L2-3 and L4-5.

3. There is reverse S-shaped scoliosis of the lumbar spine. There is 

multilevel degenerative disc disease greatest at L2-3 and L4-5, endplate 

edema at these levels likely reactive/degenerative in etiology.

 

Electronically signed by: Oracio Freedman MD (8/2/2019 4:50 PM) [facility]

## 2020-06-11 VITALS — DIASTOLIC BLOOD PRESSURE: 72 MMHG | SYSTOLIC BLOOD PRESSURE: 143 MMHG
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
## 2020-06-11 NOTE — RAD
PORTABLE CHEST 1V

 

Clinical Indication: Reason: high cluose  16 / Spl. Instructions:  / 

History: 

 

Comparison:  AP chest 2/3/2020.

 

Findings: 

The cardiomediastinal silhouette is normal. Lungs are clear. There is no 

pneumothorax. No pleural effusion is appreciated. No acute bone 

abnormality.

 

IMPRESSION: 

No acute cardiopulmonary process.

 

 

Electronically signed by: Thom Smith MD (6/11/2020 7:58 PM) [facility]

## 2020-06-11 NOTE — RAD
Exam: Left elbow 3 views

 

INDICATION: Fall

 

TECHNIQUE: Frontal, lateral and oblique views of the left elbow

 

Comparisons: None

 

FINDINGS:

Bone mineralization is normal. No acute or healed fractures. Mild soft 

tissue swelling overlying the olecranon. Joint spaces are well-maintained.

 

IMPRESSION:

Mild soft tissue swelling overlying the olecranon without underlying 

osseous abnormality identified.

 

Electronically signed by: Rad Mendez MD (6/11/2020 8:40 PM) [id number]

## 2020-06-12 NOTE — EKG
[facility]

              [address] [place] [address]

Test Date:    2020               Test Time:    19:05:31

Pat Name:     MISAEL LARIOS          Department:   

Patient ID:   [id number]           Room:          

Gender:       F                        Technician:   

:          1955               Requested By: GIANNI SANCHEZ

Order Number: [id number]           Reading MD:   John Ryan

                                 Measurements

Intervals                              Axis          

Rate:         85                       P:            52

NH:           118                      QRS:          31

QRSD:         76                       T:            14

QT:           370                                    

QTc:          440                                    

                           Interpretive Statements

SINUS RHYTHM

ATRIAL PREMATURE COMPLEX(ES)

NONSPECIFIC ST-T WAVE CHANGES.



Electronically Signed On 2020 16:44:38 CDT by John Ryan

## 2020-07-20 NOTE — RAD
EXAM: Brain MRI without contrast.

 

HISTORY: Hand tremor. Weakness.

 

TECHNIQUE: Multiplanar, multisequence magnetic resonance imaging of the 

brain was performed without contrast.

 

COMPARISON: Head CT dated 9/19/2019.

 

FINDINGS: There is no restricted diffusion to suggest acute or subacute 

infarction. There is no susceptibility effect to suggest hemorrhage. There

is no mass effect or midline shift. There is no hydrocephalus. There are 

scattered areas of signal change throughout the cerebral white matter and 

katarzyna, most commonly due to chronic small vessel disease.

 

The orbits are unremarkable. The paranasal sinuses and mastoid air cells 

are unremarkable. There are normal flow voids within the cerebral vessels.

No calvarial lesion is seen.

 

IMPRESSION: 

1. No acute intracranial finding.

2. Scattered areas of signal change within the cerebral white matter and 

katarzyna, most commonly due to chronic small vessel disease.

 

Electronically signed by: Liliana Phan MD (7/20/2020 9:41 AM) [id number]

## 2020-08-24 ENCOUNTER — HOSPITAL ENCOUNTER (OUTPATIENT)
Dept: HOSPITAL 61 - RAD | Age: 65
Discharge: HOME | End: 2020-08-24
Attending: FAMILY MEDICINE
Payer: MEDICARE

## 2020-08-24 DIAGNOSIS — M25.471: ICD-10-CM
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
## 2020-08-24 DIAGNOSIS — S82.891A: Primary | ICD-10-CM
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
## 2020-08-24 PROCEDURE — 73630 X-RAY EXAM OF FOOT: CPT

## 2020-08-24 PROCEDURE — 73610 X-RAY EXAM OF ANKLE: CPT

## 2020-08-24 NOTE — RAD
PROCEDURE: ANKLE RIGHT 3V, FOOT RIGHT 3V

 

CLINICAL INDICATION / HISTORY: Reason: TRAUMA / Spl. Instructions:  / 

History: .

 

TECHNIQUE: Right ankle 3 views.

 

COMPARISON: None

 

FINDINGS: The ankle mortise is approximated, and the talar dome is 

unremarkable. The joint space widths are maintained. No fracture or 

dislocation is identified. Mild diffuse soft tissue swelling is 

appreciated.

 

IMPRESSION: Mild diffuse soft tissue swelling of the right ankle with no 

acute osseous abnormality.

 

 

 

PROCEDURE: ANKLE RIGHT 3V, FOOT RIGHT 3V

 

CLINICAL INDICATION / HISTORY: Reason: TRAUMA / Spl. Instructions:  / 

History: .

 

TECHNIQUE: AP, lateral and oblique views of the right foot.

 

COMPARISON: Right ankle same day

 

FINDINGS: No fracture or dislocation is identified. The bone density is 

normal. The joint space widths are maintained, and there are no erosions 

to suggest an inflammatory arthropathy. Mild soft tissue swelling of the 

ankle is seen.

 

IMPRESSION: Mild right ankle soft tissue swelling with no acute osseous 

abnormality in the right foot.

 

Electronically signed by: Teresa Ortiz MD (8/24/2020 5:16 PM) 

[id number]

## 2020-08-24 NOTE — RAD
PROCEDURE: ANKLE RIGHT 3V, FOOT RIGHT 3V

 

CLINICAL INDICATION / HISTORY: Reason: TRAUMA / Spl. Instructions:  / 

History: .

 

TECHNIQUE: Right ankle 3 views.

 

COMPARISON: None

 

FINDINGS: The ankle mortise is approximated, and the talar dome is 

unremarkable. The joint space widths are maintained. No fracture or 

dislocation is identified. Mild diffuse soft tissue swelling is 

appreciated.

 

IMPRESSION: Mild diffuse soft tissue swelling of the right ankle with no 

acute osseous abnormality.

 

 

 

PROCEDURE: ANKLE RIGHT 3V, FOOT RIGHT 3V

 

CLINICAL INDICATION / HISTORY: Reason: TRAUMA / Spl. Instructions:  / 

History: .

 

TECHNIQUE: AP, lateral and oblique views of the right foot.

 

COMPARISON: Right ankle same day

 

FINDINGS: No fracture or dislocation is identified. The bone density is 

normal. The joint space widths are maintained, and there are no erosions 

to suggest an inflammatory arthropathy. Mild soft tissue swelling of the 

ankle is seen.

 

IMPRESSION: Mild right ankle soft tissue swelling with no acute osseous 

abnormality in the right foot.

 

Electronically signed by: Teresa Ortiz MD (8/24/2020 5:16 PM) 

[id number]

## 2020-09-01 ENCOUNTER — HOSPITAL ENCOUNTER (EMERGENCY)
Dept: HOSPITAL 61 - ER | Age: 65
Discharge: HOME | End: 2020-09-01
Payer: MEDICARE

## 2020-09-01 VITALS — BODY MASS INDEX: 18.42 KG/M2 | HEIGHT: 66 IN | WEIGHT: 114.64 LBS

## 2020-09-01 VITALS — DIASTOLIC BLOOD PRESSURE: 53 MMHG | SYSTOLIC BLOOD PRESSURE: 116 MMHG

## 2020-09-01 DIAGNOSIS — E78.00: ICD-10-CM

## 2020-09-01 DIAGNOSIS — F17.200: ICD-10-CM

## 2020-09-01 DIAGNOSIS — Z96.649: ICD-10-CM

## 2020-09-01 DIAGNOSIS — I10: ICD-10-CM

## 2020-09-01 DIAGNOSIS — E11.65: Primary | ICD-10-CM

## 2020-09-01 LAB
% BANDS: 26 % (ref 0–9)
% LYMPHS: 8 % (ref 24–48)
% MONOS: 1 % (ref 0–10)
% SEGS: 64 % (ref 35–66)
ALBUMIN SERPL-MCNC: 3.4 G/DL (ref 3.4–5)
ALBUMIN/GLOB SERPL: 0.9 {RATIO} (ref 1–1.7)
ALP SERPL-CCNC: 121 U/L (ref 46–116)
ALT SERPL-CCNC: 28 U/L (ref 14–59)
ANION GAP SERPL CALC-SCNC: 10 MMOL/L (ref 6–14)
AST SERPL-CCNC: 21 U/L (ref 15–37)
BASOPHILS # BLD AUTO: 0 X10^3/UL (ref 0–0.2)
BASOPHILS NFR BLD: 0 % (ref 0–3)
BILIRUB SERPL-MCNC: 0.4 MG/DL (ref 0.2–1)
BUN SERPL-MCNC: 17 MG/DL (ref 7–20)
BUN/CREAT SERPL: 19 (ref 6–20)
CALCIUM SERPL-MCNC: 9.3 MG/DL (ref 8.5–10.1)
CHLORIDE SERPL-SCNC: 103 MMOL/L (ref 98–107)
CO2 SERPL-SCNC: 26 MMOL/L (ref 21–32)
CREAT SERPL-MCNC: 0.9 MG/DL (ref 0.6–1)
EOSINOPHIL NFR BLD AUTO: 1 % (ref 0–5)
EOSINOPHIL NFR BLD: 0 % (ref 0–3)
EOSINOPHIL NFR BLD: 0 X10^3/UL (ref 0–0.7)
ERYTHROCYTE [DISTWIDTH] IN BLOOD BY AUTOMATED COUNT: 13.3 % (ref 11.5–14.5)
GFR SERPLBLD BASED ON 1.73 SQ M-ARVRAT: 63.2 ML/MIN
GLOBULIN SER-MCNC: 4 G/DL (ref 2.2–3.8)
GLUCOSE SERPL-MCNC: 333 MG/DL (ref 70–99)
HCT VFR BLD CALC: 44.5 % (ref 36–47)
HGB BLD-MCNC: 14.9 G/DL (ref 12–15.5)
LIPASE: 66 U/L (ref 73–393)
LYMPHOCYTES # BLD: 0.3 X10^3/UL (ref 1–4.8)
LYMPHOCYTES NFR BLD AUTO: 5 % (ref 24–48)
MAGNESIUM SERPL-MCNC: 1.6 MG/DL (ref 1.8–2.4)
MCH RBC QN AUTO: 32 PG (ref 25–35)
MCHC RBC AUTO-ENTMCNC: 33 G/DL (ref 31–37)
MCV RBC AUTO: 96 FL (ref 79–100)
MONO #: 0.1 X10^3/UL (ref 0–1.1)
MONOCYTES NFR BLD: 2 % (ref 0–9)
NEUT #: 5.6 X10^3/UL (ref 1.8–7.7)
NEUTROPHILS NFR BLD AUTO: 93 % (ref 31–73)
PLATELET # BLD AUTO: 266 X10^3/UL (ref 140–400)
PLATELET # BLD EST: ADEQUATE 10*3/UL
POTASSIUM SERPL-SCNC: 3.2 MMOL/L (ref 3.5–5.1)
PROT SERPL-MCNC: 7.4 G/DL (ref 6.4–8.2)
RBC # BLD AUTO: 4.65 X10^6/UL (ref 3.5–5.4)
SODIUM SERPL-SCNC: 139 MMOL/L (ref 136–145)
WBC # BLD AUTO: 6.1 X10^3/UL (ref 4–11)

## 2020-09-01 PROCEDURE — 36415 COLL VENOUS BLD VENIPUNCTURE: CPT

## 2020-09-01 PROCEDURE — 85007 BL SMEAR W/DIFF WBC COUNT: CPT

## 2020-09-01 PROCEDURE — 96365 THER/PROPH/DIAG IV INF INIT: CPT

## 2020-09-01 PROCEDURE — 80053 COMPREHEN METABOLIC PANEL: CPT

## 2020-09-01 PROCEDURE — 82010 KETONE BODYS QUAN: CPT

## 2020-09-01 PROCEDURE — 82962 GLUCOSE BLOOD TEST: CPT

## 2020-09-01 PROCEDURE — 83735 ASSAY OF MAGNESIUM: CPT

## 2020-09-01 PROCEDURE — 83690 ASSAY OF LIPASE: CPT

## 2020-09-01 PROCEDURE — 99285 EMERGENCY DEPT VISIT HI MDM: CPT

## 2020-09-01 PROCEDURE — 85025 COMPLETE CBC W/AUTO DIFF WBC: CPT

## 2020-09-01 PROCEDURE — 96361 HYDRATE IV INFUSION ADD-ON: CPT

## 2020-09-01 NOTE — PHYS DOC
Past Medical History


Past Medical History:  Diabetes-Type II, High Cholesterol, Hypertension


Past Surgical History:  Hip Replacement


Additional Past Surgical Histo:  R LEG SX


Smoking Status:  Current Every Day Smoker


Alcohol Use:  None


Drug Use:  Methadone





General Adult


EDM:


Chief Complaint:  HYPERGLYCEMIA





HPI:


HPI:





Patient is a 65  year old female who was brought here by EMS due to elevated 

blood sugar.  Patient has history of diabetic, she is on insulin sliding scale. 

Patient says he was driving today, she feels weak and dizzy, she feels like her 

blood sugar was elevated.  So she stopped and check her blood sugar and it was 

high, patient gave herself 6 units of regular insulin injection and then she 

called EMS to take her here for evaluation.  Patient was given 500 cc of normal 

saline IV by EMS and 4 mg Zofran IV by EMS on route here.  Patient denies any 

cough, no fever, no abdominal pain, no chest pain, no trouble breathing.





Review of Systems:


Review of Systems:


Constitutional:   Denies fever or chills.  Positive for general weakness


Eyes:   Denies change in visual acuity. []


HENT:   Denies nasal congestion or sore throat. [] 


Respiratory:   Denies cough or shortness of breath. [] 


Cardiovascular:   Denies chest pain or edema. [] 


GI:   Denies abdominal pain,, vomiting, bloody stools or diarrhea.  Positive for

 nausea.


:  Denies dysuria. [] 


Musculoskeletal:   Denies back pain or joint pain. [] 


Integument:   Denies rash. [] 


Neurologic:   Denies headache, focal weakness or sensory changes. [] 


Endocrine:   Denies polyuria or polydipsia. [] 


Lymphatic:  Denies swollen glands. [] 


Psychiatric:  Denies depression or anxiety. []





Heart Score:


Risk Factors:


Risk Factors:  DM, Current or recent (<one month) smoker, HTN, HLP, family 

history of CAD, obesity.


Risk Scores:


Score 0 - 3:  2.5% MACE over next 6 weeks - Discharge Home


Score 4 - 6:  20.3% MACE over next 6 weeks - Admit for Clinical Observation


Score 7 - 10:  72.7% MACE over next 6 weeks - Early Invasive Strategies





Current Medications:





Current Medications








 Medications


  (Trade)  Dose


 Ordered  Sig/Bud  Start Time


 Stop Time Status Last Admin


Dose Admin


 


 Magnesium Sulfate  50 ml @ 25


 mls/hr  1X  ONCE  9/1/20 17:30


 9/1/20 19:29  9/1/20 18:04


25 MLS/HR


 


 Potassium Chloride


  (Klor-Con)  40 meq  1X  ONCE  9/1/20 18:00


 9/1/20 18:01 DC 9/1/20 18:05


40 MEQ


 


 Sodium Chloride  1,000 ml @ 


 1,000 mls/hr  1X  ONCE  9/1/20 16:30


 9/1/20 17:29 DC 9/1/20 16:54


1,000 MLS/HR











Allergies:


Allergies:





Allergies








Coded Allergies Type Severity Reaction Last Updated Verified


 


  No Known Drug Allergies    9/24/17 No











Physical Exam:


PE:





Constitutional: Well developed, well nourished, no acute distress, non-toxic 

appearance. []


HENT: Normocephalic, atraumatic, bilateral external ears normal, oropharynx was 

dried, no oral exudates, nose normal. []


Eyes: PERRLA, EOMI, conjunctiva normal, no discharge. [] 


Neck: Normal range of motion, no tenderness, supple, no stridor. [] 


Cardiovascular:Heart rate regular rhythm, no murmur []


Lungs & Thorax:  Bilateral breath sounds clear to auscultation []


Abdomen: Bowel sounds normal, soft, no tenderness, no masses, no pulsatile 

masses. [] 


Skin: Warm, dry, no erythema, no rash. [] 


Back: No tenderness, no CVA tenderness. [] 


Extremities: No tenderness, no cyanosis, no clubbing, ROM intact, no edema. [] 


Neurologic: Alert and oriented X 3, normal motor function, normal sensory 

function, no focal deficits noted. []


Psychologic: Affect normal, judgement normal, mood normal. []





Current Patient Data:


Labs:





                                Laboratory Tests








Test


 9/1/20


16:22 9/1/20


16:25


 


Glucose (Fingerstick)


 337 mg/dL


(70-99)  H 





 


White Blood Count


 


 6.1 x10^3/uL


(4.0-11.0)


 


Red Blood Count


 


 4.65 x10^6/uL


(3.50-5.40)


 


Hemoglobin


 


 14.9 g/dL


(12.0-15.5)


 


Hematocrit


 


 44.5 %


(36.0-47.0)


 


Mean Corpuscular Volume


 


 96 fL ()





 


Mean Corpuscular Hemoglobin  32 pg (25-35)  


 


Mean Corpuscular Hemoglobin


Concent 


 33 g/dL


(31-37)


 


Red Cell Distribution Width


 


 13.3 %


(11.5-14.5)


 


Platelet Count


 


 266 x10^3/uL


(140-400)


 


Neutrophils (%) (Auto)  93 % (31-73)  H


 


Lymphocytes (%) (Auto)  5 % (24-48)  L


 


Monocytes (%) (Auto)  2 % (0-9)  


 


Eosinophils (%) (Auto)  0 % (0-3)  


 


Basophils (%) (Auto)  0 % (0-3)  


 


Neutrophils # (Auto)


 


 5.6 x10^3/uL


(1.8-7.7)


 


Lymphocytes # (Auto)


 


 0.3 x10^3/uL


(1.0-4.8)  L


 


Monocytes # (Auto)


 


 0.1 x10^3/uL


(0.0-1.1)


 


Eosinophils # (Auto)


 


 0.0 x10^3/uL


(0.0-0.7)


 


Basophils # (Auto)


 


 0.0 x10^3/uL


(0.0-0.2)


 


Segmented Neutrophils %  64 % (35-66)  


 


Band Neutrophils %  26 % (0-9)  H


 


Lymphocytes %  8 % (24-48)  L


 


Monocytes %  1 % (0-10)  


 


Eosinophils %  1 % (0-5)  


 


Platelet Estimate


 


 Adequate


(ADEQUATE)


 


Sodium Level


 


 139 mmol/L


(136-145)


 


Potassium Level


 


 3.2 mmol/L


(3.5-5.1)  L


 


Chloride Level


 


 103 mmol/L


()


 


Carbon Dioxide Level


 


 26 mmol/L


(21-32)


 


Anion Gap  10 (6-14)  


 


Blood Urea Nitrogen


 


 17 mg/dL


(7-20)


 


Creatinine


 


 0.9 mg/dL


(0.6-1.0)


 


Estimated GFR


(Cockcroft-Gault) 


 63.2  





 


BUN/Creatinine Ratio  19 (6-20)  


 


Glucose Level


 


 333 mg/dL


(70-99)  H


 


Calcium Level


 


 9.3 mg/dL


(8.5-10.1)


 


Magnesium Level


 


 1.6 mg/dL


(1.8-2.4)  L


 


Total Bilirubin


 


 0.4 mg/dL


(0.2-1.0)


 


Aspartate Amino Transferase


(AST) 


 21 U/L (15-37)





 


Alanine Aminotransferase (ALT)


 


 28 U/L (14-59)





 


Alkaline Phosphatase


 


 121 U/L


()  H


 


Total Protein


 


 7.4 g/dL


(6.4-8.2)


 


Albumin


 


 3.4 g/dL


(3.4-5.0)


 


Albumin/Globulin Ratio


 


 0.9 (1.0-1.7)


L


 


Lipase


 


 66 U/L


()  L


 


Acetone Level  Neg (NEG)  





                                Laboratory Tests


9/1/20 16:25








                                Laboratory Tests


9/1/20 16:25








Vital Signs:





                                   Vital Signs








  Date Time  Temp Pulse Resp B/P (MAP) Pulse Ox O2 Delivery O2 Flow Rate FiO2


 


9/1/20 16:12 97.5 64 18 112/57 (75) 97 Room Air  





 97.5       











EKG:


EKG:


[]





Radiology/Procedures:


Radiology/Procedures:


[]





Course & Med Decision Making:


Course & Med Decision Making


Pertinent Labs and Imaging studies reviewed. (See chart for details)





Patient is a 65-year-old female who was brought here by EMS due to elevated 

blood sugar.  Patient was received insulin at home by herself and she was given 

500 cc of normal saline IV bolus and 4 mg Zofran by EMS on route.  Work-up in 

the ER did not show that patient had DKA.  Her blood sugar was around 300.  

Patient was given IV fluid.  Patient potassium level and magnesium level were 

low.  Patient was given magnesium and potassium in the ER.  Patient feel much 

better, she will be discharged home, she was instructed to call her family 

doctor for follow-up.





Dragon Disclaimer:


Dragon Disclaimer:


This electronic medical record was generated, in whole or in part, using a voice

 recognition dictation system.





Departure


Departure


Impression:  


   Primary Impression:  


   Hyperglycemia


Disposition:  01 HOME, SELF-CARE


Condition:  IMPROVED


Referrals:  


MARIO FIELD MD (PCP)


PLEASE CALL YOUR FAMILY DOCTOR FOR FOLLOW UP THIS WEEK


Patient Instructions:  Hyperglycemia





Additional Instructions:  


Thank you for visiting our Emergency Department. We appreciate you trusting us 

with your care. If any additional problems come up don't hesitate to return to 

visit us. Please follow up with your primary care provider so they can plan 

additional care if needed and know about the problem that you had. If symptoms 

worsen come back to the Emergency Department. Any concerning symptoms that start

 such as chest pain, shortness of air, weakness or numbness on one side of the 

body, running high fevers or any other concerning symptoms return to the ER.





Justicifation of Admission Dx:


Justifications for Admission:


Justification of Admission Dx:  N/A











ELEN ANDREW DO                 Sep 1, 2020 18:16